# Patient Record
(demographics unavailable — no encounter records)

---

## 2024-10-15 NOTE — ASSESSMENT
[FreeTextEntry1] : bp stable hld to be checked cleared for colonscopy refused flu vaccine  follow up for cpe ED meds sent

## 2024-10-15 NOTE — REVIEW OF SYSTEMS
negative
[Negative] : Heme/Lymph

## 2024-10-17 NOTE — HEALTH RISK ASSESSMENT
[No] : No [Little interest or pleasure doing things] : 1) Little interest or pleasure doing things [Feeling down, depressed, or hopeless] : 2) Feeling down, depressed, or hopeless [Never] : Never [Yes] : Yes [No falls in past year] : Patient reported no falls in the past year [0] : 2) Feeling down, depressed, or hopeless: Not at all (0) [PHQ-2 Negative - No further assessment needed] : PHQ-2 Negative - No further assessment needed [[id number]] : 0

## 2024-10-17 NOTE — HISTORY OF PRESENT ILLNESS
[FreeTextEntry1] : colonoscopy clearance [de-identified] : colonoscopy clearance patient has appt for colorectal cancer screening has been well

## 2024-10-17 NOTE — HEALTH RISK ASSESSMENT
[No] : No [Little interest or pleasure doing things] : 1) Little interest or pleasure doing things [Feeling down, depressed, or hopeless] : 2) Feeling down, depressed, or hopeless [Never] : Never [Yes] : Yes [No falls in past year] : Patient reported no falls in the past year [0] : 2) Feeling down, depressed, or hopeless: Not at all (0) [PHQ-2 Negative - No further assessment needed] : PHQ-2 Negative - No further assessment needed [[id number]] : 0

## 2024-10-17 NOTE — HEALTH RISK ASSESSMENT
[No] : No [Little interest or pleasure doing things] : 1) Little interest or pleasure doing things [Feeling down, depressed, or hopeless] : 2) Feeling down, depressed, or hopeless [Never] : Never [Yes] : Yes [No falls in past year] : Patient reported no falls in the past year [0] : 2) Feeling down, depressed, or hopeless: Not at all (0) [PHQ-2 Negative - No further assessment needed] : PHQ-2 Negative - No further assessment needed [[id number]] : 0

## 2024-10-17 NOTE — HISTORY OF PRESENT ILLNESS
[FreeTextEntry1] : colonoscopy clearance [de-identified] : colonoscopy clearance patient has appt for colorectal cancer screening has been well

## 2024-10-17 NOTE — HEALTH RISK ASSESSMENT
[No] : No [Little interest or pleasure doing things] : 1) Little interest or pleasure doing things [Feeling down, depressed, or hopeless] : 2) Feeling down, depressed, or hopeless [Never] : Never [Yes] : Yes [No falls in past year] : Patient reported no falls in the past year [0] : 2) Feeling down, depressed, or hopeless: Not at all (0) [PHQ-2 Negative - No further assessment needed] : PHQ-2 Negative - No further assessment needed [[id number]] : 0

## 2024-10-17 NOTE — PLAN
[FreeTextEntry1] : cleared for colonsocpy when ordered bp stable hld to be checked refused flu vaccine

## 2024-10-17 NOTE — HISTORY OF PRESENT ILLNESS
[FreeTextEntry1] : colonoscopy clearance [de-identified] : colonoscopy clearance patient has appt for colorectal cancer screening has been well

## 2024-10-17 NOTE — HISTORY OF PRESENT ILLNESS
[FreeTextEntry1] : colonoscopy clearance [de-identified] : colonoscopy clearance patient has appt for colorectal cancer screening has been well

## 2024-11-01 NOTE — PROCEDURE
[FreeTextEntry1] : Risk and benefits of hemorrhoid banding was performed.  Prolapsed internal hemorrhoid is banded above the dentate line.  He tolerated the procedure well.

## 2024-11-01 NOTE — PLAN
[TextEntry] : 65-year-old male with prolapsed bleeding internal hemorrhoid.  Banding was performed as described above.  Postprocedure instructions provided.  I recommend a colonoscopy for screening purposes.  If banding did not improve the prolapsed internal hemorrhoid, will likely need hemorrhoidectomy to address with the internal and external components. Risks and benefits of surgery reviewed. Risks include but are limited to cardiopulmonary complications from anesthesia, risk of bleeding, infection, abscess, fistula, injury to perianal nerves and muscles with resulting in incontinence.

## 2024-11-01 NOTE — HISTORY OF PRESENT ILLNESS
Rand Clemons called for pt and stated that he is getting a really bad uti and needs an antibiotic called into the pharmacy   [FreeTextEntry1] : 65-year-old male who presents for consultation for rectal bleeding and colonoscopy.  He was seen 2 years ago and noted to have prolapsing internal hemorrhoids for which surgery was recommended.  He did not pursue surgery or colonoscopy due to insurance reasons and is now here for with the same problem.  Bleeding occurs mostly with bowel movements but occasionally happens even without bowel movements.  He has also noticed some soiling in his underwear.  He is using over-the-counter bowel regimen to help with constipation.  Denies abdominal pain, nausea or vomiting.

## 2024-11-01 NOTE — PHYSICAL EXAM
[Excoriation] : no perianal excoriation [Normal] : was normal [None] : there was no rectal mass  [Gross Blood] : no gross blood [Respiratory Effort] : normal respiratory effort [Normal Rate and Rhythm] : normal rate and rhythm [Calm] : calm [de-identified] : Soft, nontender, nondistended.  No mass or hernia appreciated [de-identified] : Prolapsed internal hemorrhoid [de-identified] : Nonthrombosed external hemorrhoids [de-identified] : Well-appearing, in no distress [de-identified] : Normocephalic, atraumatic [de-identified] : Moves extremities without difficulty [de-identified] : Warm and dry [de-identified] : Alert and oriented x3